# Patient Record
Sex: FEMALE | Race: WHITE | NOT HISPANIC OR LATINO | Employment: PART TIME | URBAN - NONMETROPOLITAN AREA
[De-identification: names, ages, dates, MRNs, and addresses within clinical notes are randomized per-mention and may not be internally consistent; named-entity substitution may affect disease eponyms.]

---

## 2023-12-08 ENCOUNTER — OFFICE VISIT (OUTPATIENT)
Dept: OTOLARYNGOLOGY | Facility: CLINIC | Age: 58
End: 2023-12-08
Payer: COMMERCIAL

## 2023-12-08 DIAGNOSIS — H93.A2 PULSATILE TINNITUS OF LEFT EAR: Primary | ICD-10-CM

## 2023-12-08 PROCEDURE — 3008F BODY MASS INDEX DOCD: CPT | Performed by: OTOLARYNGOLOGY

## 2023-12-08 PROCEDURE — 99204 OFFICE O/P NEW MOD 45 MIN: CPT | Performed by: OTOLARYNGOLOGY

## 2023-12-08 NOTE — LETTER
"December 8, 2023     Valentín Stovall DO  [address]  [facility] [address]    Patient: Pastora Lennon   YOB: 1965   Date of Visit: 12/8/2023       Dear Dr. Valentín Stovall DO:    Thank you for referring Pastora Lennon to me for evaluation. Below are my notes for this consultation.  If you have questions, please do not hesitate to call me. I look forward to following your patient along with you.       Sincerely,     Kathy Maharaj MD      CC: No Recipients  ______________________________________________________________________________________    History Of Present Illness  Pastora Lennon is a 58 y.o. female presenting with pulsatile sound humming in her left ear. She is kindly referred by Bernardino Stovall DO.     She had a pulsating sound in her left ear for couple of months, it went away 2-3 days ago. Now, she has a constant humming sound in her left ear. This morning she heard it little bit, then it went away again. There was fullness in her left ear, it went away. She has tried oral doxycycline, mucinex, fluticasone nasal spray, montelukast.     Since August 2023, she has a cough and phlegm.   CT head April 2023 was essentially normal.    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     Copy from her audiological history :   \"... Tinnitus: She has been perceiving a mostly constant pulsatile tinnitus (hears her heart beat) in her LEFT ear since October 2023. She stated it is exacerbated with physical exertion.   Dizziness: Denied.  Otalgia: Denied.  Aural fullness/pressure: Experiences a feeling of fullness in her LEFT ear since October 2023.   Otologic history: Denied.  Noise exposure: Denied.  History of chemotherapy or radiation: Denied.  Family history of hearing loss: Denied.  Other Concerns: Sometimes her LEFT ear will pop. She has had a previous Hx of her RIGHT ear feeling plugged. She has her ear flushed, and then it no longer feels " "full.      IMPRESSIONS    RIGHT EAR: Essentially hearing within normal limits.  LEFT EAR: High-frequency hearing loss...\"    She gained weight in the past one year. Recently she has lost 4 pounds.   She has used pravastatin for high cholesterol.    TMs look intact.  My impression, her pulsatile tinnitus could be due to gaining weight, atherosclerosis and chronic cough. However, I'd like to ask for an MRI to make sure that she does not have any vascular abnormality and/or tumor.    Plan:  1- MRI and MRA  2- follow up after the imaging studies     Past Medical History  She has a past medical history of Bitten or stung by nonvenomous insect and other nonvenomous arthropods, initial encounter (04/19/2016).    Surgical History  She has a past surgical history that includes Cholecystectomy (05/15/2014).     Social History  She has no history on file for tobacco use, alcohol use, and drug use.    Family History  No family history on file.     Allergies  Patient has no allergy information on record.    Review of Systems   Tinnitus  Ears feel full  Nasal discharge  Sinus pressure  Cough  Coughing up sputum  Headache  Hot flashes       Physical Exam    General appearance: Healthy-appearing, well-nourished, well groomed, in no acute distress.     Head and Face: Atraumatic with no masses, lesions, or scarring.      Salivary glands: No tenderness of the parotid glands or parotid masses.     No tenderness of the submandibular glands or submandibular masses.      Facial strength: Normal strength and symmetry, no synkinesis or facial tic.     Eyes: Conjunctivas look non-hyperemic bilaterally    Ears: Bilaterally mild moderate wax in ear canals. Tympanic membranes look intact, no hyperemia, fluid or retraction. Hearing grossly normal.      Nose: Mucosa looks normal. No purulent discharge.    Oral Cavity/Mouth: Lips and tongue look normal.     Throat: No postnasal discharge. No tonsil hypertrophy. No hyperemia.    Neck: Symmetrical, " trachea midline.     Pulmonary: Normal respiratory effort.     Lymphatic: No palpable pathologic lymph nodes at neck.     Neurological/Psychiatric Orientation to person, place, and time: Normal.     Mood and affect: Normal.      Extremities: No clubbing.     Skin: No significant skin lesions were noted at face or neck        Last Recorded Vitals  There were no vitals taken for this visit.    Relevant Results  Prior to Admission medications    Not on File     No results found.      Assessment/Plan  @  Problem List Items Addressed This Visit    None         Debra Alfaro  Otolaryngology - Head & Neck Surgery

## 2023-12-08 NOTE — PROGRESS NOTES
"History Of Present Illness  Pastora Lennon is a 58 y.o. female presenting with pulsatile sound humming in her left ear. She is kindly referred by Bernardino Stovall DO.     She had a pulsating sound in her left ear for couple of months, it went away 2-3 days ago. Now, she has a constant humming sound in her left ear. This morning she heard it little bit, then it went away again. There was fullness in her left ear, it went away. She has tried oral doxycycline, mucinex, fluticasone nasal spray, montelukast.     Since August 2023, she has a cough and phlegm.   CT head April 2023 was essentially normal.    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     Copy from her audiological history :   \"... Tinnitus: She has been perceiving a mostly constant pulsatile tinnitus (hears her heart beat) in her LEFT ear since October 2023. She stated it is exacerbated with physical exertion.   Dizziness: Denied.  Otalgia: Denied.  Aural fullness/pressure: Experiences a feeling of fullness in her LEFT ear since October 2023.   Otologic history: Denied.  Noise exposure: Denied.  History of chemotherapy or radiation: Denied.  Family history of hearing loss: Denied.  Other Concerns: Sometimes her LEFT ear will pop. She has had a previous Hx of her RIGHT ear feeling plugged. She has her ear flushed, and then it no longer feels full.      IMPRESSIONS    RIGHT EAR: Essentially hearing within normal limits.  LEFT EAR: High-frequency hearing loss...\"    She gained weight in the past one year. Recently she has lost 4 pounds.   She has used pravastatin for high cholesterol.    TMs look intact.  My impression, her pulsatile tinnitus could be due to gaining weight, atherosclerosis and chronic cough. However, I'd like to ask for an MRI to make sure that she does not have any vascular abnormality and/or tumor.    Plan:  1- MRI and MRA  2- follow up after the imaging studies     Past Medical History  She has " a past medical history of Bitten or stung by nonvenomous insect and other nonvenomous arthropods, initial encounter (04/19/2016).    Surgical History  She has a past surgical history that includes Cholecystectomy (05/15/2014).     Social History  She has no history on file for tobacco use, alcohol use, and drug use.    Family History  No family history on file.     Allergies  Patient has no allergy information on record.    Review of Systems   Tinnitus  Ears feel full  Nasal discharge  Sinus pressure  Cough  Coughing up sputum  Headache  Hot flashes       Physical Exam    General appearance: Healthy-appearing, well-nourished, well groomed, in no acute distress.     Head and Face: Atraumatic with no masses, lesions, or scarring.      Salivary glands: No tenderness of the parotid glands or parotid masses.     No tenderness of the submandibular glands or submandibular masses.      Facial strength: Normal strength and symmetry, no synkinesis or facial tic.     Eyes: Conjunctivas look non-hyperemic bilaterally    Ears: Bilaterally mild moderate wax in ear canals. Tympanic membranes look intact, no hyperemia, fluid or retraction. Hearing grossly normal.      Nose: Mucosa looks normal. No purulent discharge.    Oral Cavity/Mouth: Lips and tongue look normal.     Throat: No postnasal discharge. No tonsil hypertrophy. No hyperemia.    Neck: Symmetrical, trachea midline.     Pulmonary: Normal respiratory effort.     Lymphatic: No palpable pathologic lymph nodes at neck.     Neurological/Psychiatric Orientation to person, place, and time: Normal.     Mood and affect: Normal.      Extremities: No clubbing.     Skin: No significant skin lesions were noted at face or neck        Last Recorded Vitals  There were no vitals taken for this visit.    Relevant Results  (Result was added to note on 12.26.2023)    STUDY:  MR BRAIN W AND WO IV CONTRAST; MR ANGIO HEAD WO IV CONTRAST;  12/23/2023 11:15 am       INDICATION:  Signs/Symptoms:history of left pulsatile tinnitus, left high  frequency hearing loss; Signs/Symptoms:history of left pulsatile  tinnitus and left high frequency hearing loss.      COMPARISON:  None.      ACCESSION NUMBER(S):  [id number]; [id number]      ORDERING CLINICIAN:  ANDRES SKINNER      TECHNIQUE:  The brain was studied in the sagittal, axial and coronal planes  utilizing FLAIR, T1 and T2 weighted images.     Following intravenous  injection of gadolinium contrast, T1 weighted fat suppressed  multiplanar images were also performed.      FINDINGS:  There is a normal-size ventricular system.  There is no evidence of  intracranial mass or extra-axial collection.      There is polypoid mucosal thickening throughout the paranasal sinuses  with involvement greatest at the left frontal sinus, bilateral  anterior ethmoid air cells and sphenoid sinuses. Bilateral maxillary  sinuses and bilateral mastoid air cells also exhibit mucosal  thickening. Trace retained fluid is suspected in the left maxillary  sinus. The skull base, paranasal sinuses and orbital structures are  otherwise unremarkable      Diffusion weighted images and associated ADC maps of the brain were  unremarkable.  There is no evidence of diffusion restriction to  suggest the presence of acute infarction. Gradient echo T2 weighted  images fail to demonstrate hemosiderin deposition or other evidence  of hemorrhage.      Following intravenous injection of there is no abnormal enhancement.  There is normal contrast opacification of the dural venous sinuses.      Magnetic resonance angiography of the intracranial vessels was  performed utilizing 3-D time-of-flight imaging with 3-D  reconstruction.  The examination fails to reveal the presence of  focal narrowing or branch occlusion.  There is no evidence of  aneurysm or vascular malformation.      IMPRESSION:  * There is no evidence of mass, infarction or hemorrhage.  *No evidence of  "anomalous or aberrant arterial structure  *Normal contrast opacification of the dural venous sinuses  *Postinflammatory/postobstructive changes in the paranasal sinuses as  described          Assessment/Plan   Pastora Lennon is a 58 y.o. female presenting with pulsatile sound humming in her left ear. She is kindly referred by Bernardino Stovall DO.     She had a pulsating sound in her left ear for couple of months, it went away 2-3 days ago. Now, she has a constant humming sound in her left ear. This morning she heard it little bit, then it went away again. There was fullness in her left ear, it went away. She has tried oral doxycycline, mucinex, fluticasone nasal spray, montelukast.     Since August 2023, she has a cough and phlegm.   CT head April 2023 was essentially normal.    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     Copy from her audiological history :   \"... Tinnitus: She has been perceiving a mostly constant pulsatile tinnitus (hears her heart beat) in her LEFT ear since October 2023. She stated it is exacerbated with physical exertion.   Dizziness: Denied.  Otalgia: Denied.  Aural fullness/pressure: Experiences a feeling of fullness in her LEFT ear since October 2023.   Otologic history: Denied.  Noise exposure: Denied.  History of chemotherapy or radiation: Denied.  Family history of hearing loss: Denied.  Other Concerns: Sometimes her LEFT ear will pop. She has had a previous Hx of her RIGHT ear feeling plugged. She has her ear flushed, and then it no longer feels full.      IMPRESSIONS    RIGHT EAR: Essentially hearing within normal limits.  LEFT EAR: High-frequency hearing loss...\"    She gained weight in the past one year. Recently she has lost 4 pounds.   She has used pravastatin for high cholesterol.    TMs look intact.  My impression, her pulsatile tinnitus could be due to gaining weight, atherosclerosis and chronic cough. However, I'd like to ask for " an MRI to make sure that she does not have any vascular abnormality and/or tumor.    Plan:  1- MRI and MRA  2- follow up after the imaging studies    Kathy Maharaj  Otolaryngology - Head & Neck Surgery

## 2023-12-23 ENCOUNTER — HOSPITAL ENCOUNTER (OUTPATIENT)
Dept: RADIOLOGY | Facility: HOSPITAL | Age: 58
Discharge: HOME | End: 2023-12-23
Payer: COMMERCIAL

## 2023-12-23 DIAGNOSIS — H93.A2 PULSATILE TINNITUS OF LEFT EAR: ICD-10-CM

## 2023-12-23 PROCEDURE — 70553 MRI BRAIN STEM W/O & W/DYE: CPT

## 2023-12-23 PROCEDURE — 70553 MRI BRAIN STEM W/O & W/DYE: CPT | Performed by: RADIOLOGY

## 2023-12-23 PROCEDURE — A9575 INJ GADOTERATE MEGLUMI 0.1ML: HCPCS | Performed by: OTOLARYNGOLOGY

## 2023-12-23 PROCEDURE — 2550000001 HC RX 255 CONTRASTS: Performed by: OTOLARYNGOLOGY

## 2023-12-23 PROCEDURE — 70544 MR ANGIOGRAPHY HEAD W/O DYE: CPT | Performed by: RADIOLOGY

## 2023-12-23 PROCEDURE — 70544 MR ANGIOGRAPHY HEAD W/O DYE: CPT

## 2023-12-23 RX ORDER — GADOTERATE MEGLUMINE 376.9 MG/ML
18 INJECTION INTRAVENOUS
Status: COMPLETED | OUTPATIENT
Start: 2023-12-23 | End: 2023-12-23

## 2023-12-23 RX ADMIN — GADOTERATE MEGLUMINE 18 ML: 376.9 INJECTION INTRAVENOUS at 11:16

## 2024-01-05 ENCOUNTER — OFFICE VISIT (OUTPATIENT)
Dept: OTOLARYNGOLOGY | Facility: CLINIC | Age: 59
End: 2024-01-05
Payer: COMMERCIAL

## 2024-01-05 DIAGNOSIS — J32.3 CHRONIC SPHENOIDAL SINUSITIS: ICD-10-CM

## 2024-01-05 DIAGNOSIS — J32.2 CHRONIC ETHMOIDAL SINUSITIS: ICD-10-CM

## 2024-01-05 DIAGNOSIS — J32.1 CHRONIC FRONTAL SINUSITIS: ICD-10-CM

## 2024-01-05 DIAGNOSIS — J32.0 CHRONIC MAXILLARY SINUSITIS: Primary | ICD-10-CM

## 2024-01-05 PROCEDURE — 3008F BODY MASS INDEX DOCD: CPT | Performed by: OTOLARYNGOLOGY

## 2024-01-05 PROCEDURE — 99214 OFFICE O/P EST MOD 30 MIN: CPT | Performed by: OTOLARYNGOLOGY

## 2024-01-05 NOTE — PROGRESS NOTES
"History Of Present Illness    01.05.2024: She still has pulsatile tinnitus in her left ear. It is off and on. More prominent at night and in the morning. It started with a sinus infection in August. She has used two rounds of antibiotics, but she could not recover fully. Her MRI does not show any tumor or vascular malformation, but she has severe sinus disease affecting ethmoids, maxillary sphenoid and left frontal sinuses.     Plan:  1- clarithromycin tab  2- oral prednisone tab  3- follow up in 2 weeks    _________________________________________________________________    Pastora Lennon is a 58 y.o. female presenting with pulsatile sound humming in her left ear. She is kindly referred by Bernardino Stovall DO.     She had a pulsating sound in her left ear for couple of months, it went away 2-3 days ago. Now, she has a constant humming sound in her left ear. This morning she heard it little bit, then it went away again. There was fullness in her left ear, it went away. She has tried oral doxycycline, mucinex, fluticasone nasal spray, montelukast.     Since August 2023, she has a cough and phlegm.   CT head April 2023 was essentially normal.    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     Copy from her audiological history :   \"... Tinnitus: She has been perceiving a mostly constant pulsatile tinnitus (hears her heart beat) in her LEFT ear since October 2023. She stated it is exacerbated with physical exertion.   Dizziness: Denied.  Otalgia: Denied.  Aural fullness/pressure: Experiences a feeling of fullness in her LEFT ear since October 2023.   Otologic history: Denied.  Noise exposure: Denied.  History of chemotherapy or radiation: Denied.  Family history of hearing loss: Denied.  Other Concerns: Sometimes her LEFT ear will pop. She has had a previous Hx of her RIGHT ear feeling plugged. She has her ear flushed, and then it no longer feels " "full.      IMPRESSIONS    RIGHT EAR: Essentially hearing within normal limits.  LEFT EAR: High-frequency hearing loss...\"    She gained weight in the past one year. Recently she has lost 4 pounds.   She has used pravastatin for high cholesterol.    TMs look intact.  My impression, her pulsatile tinnitus could be due to gaining weight, atherosclerosis and chronic cough. However, I'd like to ask for an MRI to make sure that she does not have any vascular abnormality and/or tumor.    Plan:  1- MRI and MRA  2- follow up after the imaging studies     Past Medical History  She has a past medical history of Bitten or stung by nonvenomous insect and other nonvenomous arthropods, initial encounter (04/19/2016).    Surgical History  She has a past surgical history that includes Cholecystectomy (05/15/2014) and MR angio head wo IV contrast (12/23/2023).     Social History  She has no history on file for tobacco use, alcohol use, and drug use.    Family History  No family history on file.     Allergies  Patient has no allergy information on record.    Review of Systems   Tinnitus  Ears feel full  Nasal discharge  Sinus pressure  Cough  Coughing up sputum  Headache  Hot flashes       Physical Exam    General appearance: Healthy-appearing, well-nourished, well groomed, in no acute distress.     Head and Face: Atraumatic with no masses, lesions, or scarring.      Salivary glands: No tenderness of the parotid glands or parotid masses.     No tenderness of the submandibular glands or submandibular masses.      Facial strength: Normal strength and symmetry, no synkinesis or facial tic.     Eyes: Conjunctivas look non-hyperemic bilaterally    Ears: Bilaterally mild moderate wax in ear canals. Tympanic membranes look intact, no hyperemia, fluid or retraction. Hearing grossly normal.      Nose: Mucosa looks normal. No purulent discharge.    Oral Cavity/Mouth: Lips and tongue look normal.     Throat: No postnasal discharge. No tonsil " hypertrophy. No hyperemia.    Neck: Symmetrical, trachea midline.     Pulmonary: Normal respiratory effort.     Lymphatic: No palpable pathologic lymph nodes at neck.     Neurological/Psychiatric Orientation to person, place, and time: Normal.     Mood and affect: Normal.      Extremities: No clubbing.     Skin: No significant skin lesions were noted at face or neck        Last Recorded Vitals  There were no vitals taken for this visit.    Relevant Results  (Result was added to note on 12.26.2023)    STUDY:  MR BRAIN W AND WO IV CONTRAST; MR ANGIO HEAD WO IV CONTRAST;  12/23/2023 11:15 am      INDICATION:  Signs/Symptoms:history of left pulsatile tinnitus, left high  frequency hearing loss; Signs/Symptoms:history of left pulsatile  tinnitus and left high frequency hearing loss.      COMPARISON:  None.      ACCESSION NUMBER(S):  [id number]; [id number]      ORDERING CLINICIAN:  ANDRES SKINNER      TECHNIQUE:  The brain was studied in the sagittal, axial and coronal planes  utilizing FLAIR, T1 and T2 weighted images.     Following intravenous  injection of gadolinium contrast, T1 weighted fat suppressed  multiplanar images were also performed.      FINDINGS:  There is a normal-size ventricular system.  There is no evidence of  intracranial mass or extra-axial collection.      There is polypoid mucosal thickening throughout the paranasal sinuses  with involvement greatest at the left frontal sinus, bilateral  anterior ethmoid air cells and sphenoid sinuses. Bilateral maxillary  sinuses and bilateral mastoid air cells also exhibit mucosal  thickening. Trace retained fluid is suspected in the left maxillary  sinus. The skull base, paranasal sinuses and orbital structures are  otherwise unremarkable      Diffusion weighted images and associated ADC maps of the brain were  unremarkable.  There is no evidence of diffusion restriction to  suggest the presence of acute infarction. Gradient echo T2 weighted  images fail to  demonstrate hemosiderin deposition or other evidence  of hemorrhage.      Following intravenous injection of there is no abnormal enhancement.  There is normal contrast opacification of the dural venous sinuses.      Magnetic resonance angiography of the intracranial vessels was  performed utilizing 3-D time-of-flight imaging with 3-D  reconstruction.  The examination fails to reveal the presence of  focal narrowing or branch occlusion.  There is no evidence of  aneurysm or vascular malformation.      IMPRESSION:  * There is no evidence of mass, infarction or hemorrhage.  *No evidence of anomalous or aberrant arterial structure  *Normal contrast opacification of the dural venous sinuses  *Postinflammatory/postobstructive changes in the paranasal sinuses as  described          Assessment/Plan     01.05.2024: She still has pulsatile tinnitus in her left ear. It is off and on. More prominent at night and in the morning. It started with a sinus infection in August. She has used two rounds of antibiotics, but she could not recover fully. Her MRI does not show any tumor or vascular malformation, but she has severe sinus disease affecting ethmoids, maxillary sphenoid and left frontal sinuses.     Plan:  1- clarithromycin tab  2- oral prednisone tab  3- follow up in 2 weeks    _________________________________________________________________    Pastora Lennon is a 58 y.o. female presenting with pulsatile sound humming in her left ear. She is kindly referred by Bernardino Stovall DO.     She had a pulsating sound in her left ear for couple of months, it went away 2-3 days ago. Now, she has a constant humming sound in her left ear. This morning she heard it little bit, then it went away again. There was fullness in her left ear, it went away. She has tried oral doxycycline, mucinex, fluticasone nasal spray, montelukast.     Since August 2023, she has a cough and phlegm.   CT head April 2023 was essentially normal.    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     Copy from her audiological history :   \"... Tinnitus: She has been perceiving a mostly constant pulsatile tinnitus (hears her heart beat) in her LEFT ear since October 2023. She stated it is exacerbated with physical exertion.   Dizziness: Denied.  Otalgia: Denied.  Aural fullness/pressure: Experiences a feeling of fullness in her LEFT ear since October 2023.   Otologic history: Denied.  Noise exposure: Denied.  History of chemotherapy or radiation: Denied.  Family history of hearing loss: Denied.  Other Concerns: Sometimes her LEFT ear will pop. She has had a previous Hx of her RIGHT ear feeling plugged. She has her ear flushed, and then it no longer feels full.      IMPRESSIONS    RIGHT EAR: Essentially hearing within normal limits.  LEFT EAR: High-frequency hearing loss...\"    She gained weight in the past one year. Recently she has lost 4 pounds.   She has used pravastatin for high cholesterol.    TMs look intact.  My impression, her pulsatile tinnitus could be due to gaining weight, atherosclerosis and chronic cough. However, I'd like to ask for an MRI to make sure that she does not have any vascular abnormality and/or tumor.    Plan:  1- MRI and MRA  2- follow up after the imaging studies    Kathy Maharaj  Otolaryngology - Head & Neck Surgery  "

## 2024-01-06 RX ORDER — PREDNISONE 10 MG/1
TABLET ORAL
Qty: 34 TABLET | Refills: 0 | Status: SHIPPED | OUTPATIENT
Start: 2024-01-06

## 2024-01-06 RX ORDER — CLARITHROMYCIN 500 MG/1
500 TABLET, FILM COATED ORAL 2 TIMES DAILY
Qty: 20 TABLET | Refills: 0 | Status: SHIPPED | OUTPATIENT
Start: 2024-01-06 | End: 2024-01-16

## 2024-01-19 ENCOUNTER — APPOINTMENT (OUTPATIENT)
Dept: OTOLARYNGOLOGY | Facility: CLINIC | Age: 59
End: 2024-01-19
Payer: COMMERCIAL

## 2024-01-22 ENCOUNTER — OFFICE VISIT (OUTPATIENT)
Dept: OTOLARYNGOLOGY | Facility: CLINIC | Age: 59
End: 2024-01-22
Payer: COMMERCIAL

## 2024-01-22 DIAGNOSIS — J32.2 CHRONIC ETHMOIDAL SINUSITIS: Primary | ICD-10-CM

## 2024-01-22 PROCEDURE — 99213 OFFICE O/P EST LOW 20 MIN: CPT | Performed by: OTOLARYNGOLOGY

## 2024-01-22 PROCEDURE — 3008F BODY MASS INDEX DOCD: CPT | Performed by: OTOLARYNGOLOGY

## 2024-01-22 NOTE — PROGRESS NOTES
"History Of Present Illness    01.22.24: The sinus infection feels about 90% better. Little cough and phlegm remaining , but it feels so much better.     Oral prednisone has helped with pulsatile sound in her ears, but  it came back, when she tapered it to 2 pills a day (20 mg).    On examination, tympanic membranes look intact.  No nasal or postnasal discharge.    Plan:  1-follow-up in 1 month  2-another round of antibiotics and steroids could be considered if she cannot get better  ____________________________________________________________________    01.05.2024: She still has pulsatile tinnitus in her left ear. It is off and on. More prominent at night and in the morning. It started with a sinus infection in August. She has used two rounds of antibiotics, but she could not recover fully. Her MRI does not show any tumor or vascular malformation, but she has severe sinus disease affecting ethmoids, maxillary sphenoid and left frontal sinuses.     Plan:  1- clarithromycin tab  2- oral prednisone tab  3- follow up in 2 weeks    _________________________________________________________________    Pastora Lennon is a 58 y.o. female presenting with pulsatile sound humming in her left ear. She is kindly referred by Bernardino Stovall DO.     She had a pulsating sound in her left ear for couple of months, it went away 2-3 days ago. Now, she has a constant humming sound in her left ear. This morning she heard it little bit, then it went away again. There was fullness in her left ear, it went away. She has tried oral doxycycline, mucinex, fluticasone nasal spray, montelukast.     Since August 2023, she has a cough and phlegm.   CT head April 2023 was essentially normal.    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     Copy from her audiological history :   \"... Tinnitus: She has been perceiving a mostly constant pulsatile tinnitus (hears her heart beat) in her LEFT ear since " "October 2023. She stated it is exacerbated with physical exertion.   Dizziness: Denied.  Otalgia: Denied.  Aural fullness/pressure: Experiences a feeling of fullness in her LEFT ear since October 2023.   Otologic history: Denied.  Noise exposure: Denied.  History of chemotherapy or radiation: Denied.  Family history of hearing loss: Denied.  Other Concerns: Sometimes her LEFT ear will pop. She has had a previous Hx of her RIGHT ear feeling plugged. She has her ear flushed, and then it no longer feels full.      IMPRESSIONS    RIGHT EAR: Essentially hearing within normal limits.  LEFT EAR: High-frequency hearing loss...\"    She gained weight in the past one year. Recently she has lost 4 pounds.   She has used pravastatin for high cholesterol.    TMs look intact.  My impression, her pulsatile tinnitus could be due to gaining weight, atherosclerosis and chronic cough. However, I'd like to ask for an MRI to make sure that she does not have any vascular abnormality and/or tumor.    Plan:  1- MRI and MRA  2- follow up after the imaging studies     Past Medical History  She has a past medical history of Bitten or stung by nonvenomous insect and other nonvenomous arthropods, initial encounter (04/19/2016).    Surgical History  She has a past surgical history that includes Cholecystectomy (05/15/2014) and MR angio head wo IV contrast (12/23/2023).     Social History  She has no history on file for tobacco use, alcohol use, and drug use.    Family History  No family history on file.     Allergies  Patient has no allergy information on record.    Review of Systems   Tinnitus  Ears feel full  Nasal discharge  Sinus pressure  Cough  Coughing up sputum  Headache  Hot flashes       Physical Exam    General appearance: Healthy-appearing, well-nourished, well groomed, in no acute distress.     Head and Face: Atraumatic with no masses, lesions, or scarring.      Salivary glands: No tenderness of the parotid glands or parotid masses. "     No tenderness of the submandibular glands or submandibular masses.      Facial strength: Normal strength and symmetry, no synkinesis or facial tic.     Eyes: Conjunctivas look non-hyperemic bilaterally    Ears: Bilaterally mild moderate wax in ear canals. Tympanic membranes look intact, no hyperemia, fluid or retraction. Hearing grossly normal.      Nose: Mucosa looks normal. No purulent discharge.    Oral Cavity/Mouth: Lips and tongue look normal.     Throat: No postnasal discharge. No tonsil hypertrophy. No hyperemia.    Neck: Symmetrical, trachea midline.     Pulmonary: Normal respiratory effort.     Lymphatic: No palpable pathologic lymph nodes at neck.     Neurological/Psychiatric Orientation to person, place, and time: Normal.     Mood and affect: Normal.      Extremities: No clubbing.     Skin: No significant skin lesions were noted at face or neck        Last Recorded Vitals  There were no vitals taken for this visit.    Relevant Results  (Result was added to note on 12.26.2023)    STUDY:  MR BRAIN W AND WO IV CONTRAST; MR ANGIO HEAD WO IV CONTRAST;  12/23/2023 11:15 am      INDICATION:  Signs/Symptoms:history of left pulsatile tinnitus, left high  frequency hearing loss; Signs/Symptoms:history of left pulsatile  tinnitus and left high frequency hearing loss.      COMPARISON:  None.      ACCESSION NUMBER(S):  [id number]; [id number]      ORDERING CLINICIAN:  ANDRES SKINNER      TECHNIQUE:  The brain was studied in the sagittal, axial and coronal planes  utilizing FLAIR, T1 and T2 weighted images.     Following intravenous  injection of gadolinium contrast, T1 weighted fat suppressed  multiplanar images were also performed.      FINDINGS:  There is a normal-size ventricular system.  There is no evidence of  intracranial mass or extra-axial collection.      There is polypoid mucosal thickening throughout the paranasal sinuses  with involvement greatest at the left frontal sinus, bilateral  anterior  ethmoid air cells and sphenoid sinuses. Bilateral maxillary  sinuses and bilateral mastoid air cells also exhibit mucosal  thickening. Trace retained fluid is suspected in the left maxillary  sinus. The skull base, paranasal sinuses and orbital structures are  otherwise unremarkable      Diffusion weighted images and associated ADC maps of the brain were  unremarkable.  There is no evidence of diffusion restriction to  suggest the presence of acute infarction. Gradient echo T2 weighted  images fail to demonstrate hemosiderin deposition or other evidence  of hemorrhage.      Following intravenous injection of there is no abnormal enhancement.  There is normal contrast opacification of the dural venous sinuses.      Magnetic resonance angiography of the intracranial vessels was  performed utilizing 3-D time-of-flight imaging with 3-D  reconstruction.  The examination fails to reveal the presence of  focal narrowing or branch occlusion.  There is no evidence of  aneurysm or vascular malformation.      IMPRESSION:  * There is no evidence of mass, infarction or hemorrhage.  *No evidence of anomalous or aberrant arterial structure  *Normal contrast opacification of the dural venous sinuses  *Postinflammatory/postobstructive changes in the paranasal sinuses as  described          Assessment/Plan     01.22.24: The sinus infection feels about 90% better. Little residual cough and phlegm, but it feels so much better.     Oral prednisone has helped with pulsatile sound in her ears, but  it came back, when she tapered it to 2 pills a day (20 mg).    On examination, tympanic membranes look intact.  No nasal or postnasal discharge    Plan:  1-follow-up in 1 month  2-another round of antibiotics and steroids could be considered if she cannot get better  ____________________________________________________________________    01.05.2024: She still has pulsatile tinnitus in her left ear. It is off and on. More prominent at night and  "in the morning. It started with a sinus infection in August. She has used two rounds of antibiotics, but she could not recover fully. Her MRI does not show any tumor or vascular malformation, but she has severe sinus disease affecting ethmoids, maxillary sphenoid and left frontal sinuses.     Plan:  1- clarithromycin tab  2- oral prednisone tab  3- follow up in 2 weeks    _________________________________________________________________    Pastora Lennon is a 58 y.o. female presenting with pulsatile sound humming in her left ear. She is kindly referred by Bernardino Stovall DO.     She had a pulsating sound in her left ear for couple of months, it went away 2-3 days ago. Now, she has a constant humming sound in her left ear. This morning she heard it little bit, then it went away again. There was fullness in her left ear, it went away. She has tried oral doxycycline, mucinex, fluticasone nasal spray, montelukast.     Since August 2023, she has a cough and phlegm.   CT head April 2023 was essentially normal.    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     Copy from her audiological history :   \"... Tinnitus: She has been perceiving a mostly constant pulsatile tinnitus (hears her heart beat) in her LEFT ear since October 2023. She stated it is exacerbated with physical exertion.   Dizziness: Denied.  Otalgia: Denied.  Aural fullness/pressure: Experiences a feeling of fullness in her LEFT ear since October 2023.   Otologic history: Denied.  Noise exposure: Denied.  History of chemotherapy or radiation: Denied.  Family history of hearing loss: Denied.  Other Concerns: Sometimes her LEFT ear will pop. She has had a previous Hx of her RIGHT ear feeling plugged. She has her ear flushed, and then it no longer feels full.      IMPRESSIONS    RIGHT EAR: Essentially hearing within normal limits.  LEFT EAR: High-frequency hearing loss...\"    She gained weight in the past one year. " Recently she has lost 4 pounds.   She has used pravastatin for high cholesterol.    TMs look intact.  My impression, her pulsatile tinnitus could be due to gaining weight, atherosclerosis and chronic cough. However, I'd like to ask for an MRI to make sure that she does not have any vascular abnormality and/or tumor.    Plan:  1- MRI and MRA  2- follow up after the imaging studies    Kathy Maharaj  Otolaryngology - Head & Neck Surgery

## 2024-02-15 ENCOUNTER — APPOINTMENT (OUTPATIENT)
Dept: SLEEP MEDICINE | Facility: CLINIC | Age: 59
End: 2024-02-15
Payer: COMMERCIAL

## 2024-02-20 DIAGNOSIS — J32.9 CHRONIC SINUSITIS, UNSPECIFIED LOCATION: Primary | ICD-10-CM

## 2024-02-20 RX ORDER — PREDNISONE 10 MG/1
TABLET ORAL
Qty: 34 TABLET | Refills: 0 | Status: SHIPPED | OUTPATIENT
Start: 2024-02-20

## 2024-02-20 RX ORDER — CIPROFLOXACIN 500 MG/1
500 TABLET ORAL 2 TIMES DAILY
Qty: 20 TABLET | Refills: 0 | Status: SHIPPED | OUTPATIENT
Start: 2024-02-20 | End: 2024-03-01

## 2024-03-11 ENCOUNTER — APPOINTMENT (OUTPATIENT)
Dept: OTOLARYNGOLOGY | Facility: CLINIC | Age: 59
End: 2024-03-11
Payer: COMMERCIAL

## 2024-03-18 ENCOUNTER — APPOINTMENT (OUTPATIENT)
Dept: OTOLARYNGOLOGY | Facility: CLINIC | Age: 59
End: 2024-03-18
Payer: COMMERCIAL

## 2024-07-17 ENCOUNTER — APPOINTMENT (OUTPATIENT)
Dept: SLEEP MEDICINE | Facility: CLINIC | Age: 59
End: 2024-07-17
Payer: COMMERCIAL

## 2024-07-25 ENCOUNTER — APPOINTMENT (OUTPATIENT)
Dept: SLEEP MEDICINE | Facility: CLINIC | Age: 59
End: 2024-07-25
Payer: COMMERCIAL

## 2024-07-25 VITALS
DIASTOLIC BLOOD PRESSURE: 89 MMHG | BODY MASS INDEX: 37.45 KG/M2 | OXYGEN SATURATION: 95 % | SYSTOLIC BLOOD PRESSURE: 116 MMHG | WEIGHT: 218.2 LBS | HEART RATE: 75 BPM

## 2024-07-25 DIAGNOSIS — Z99.89 CONTINUOUS POSITIVE AIRWAY PRESSURE DEPENDENCE: ICD-10-CM

## 2024-07-25 DIAGNOSIS — E66.9 OBESITY (BMI 30-39.9): ICD-10-CM

## 2024-07-25 DIAGNOSIS — G47.33 OSA (OBSTRUCTIVE SLEEP APNEA): Primary | ICD-10-CM

## 2024-07-25 DIAGNOSIS — G47.10 HYPERSOMNIA: ICD-10-CM

## 2024-07-25 DIAGNOSIS — Z13.31 NEGATIVE DEPRESSION SCREENING: ICD-10-CM

## 2024-07-25 DIAGNOSIS — R51.9: ICD-10-CM

## 2024-07-25 DIAGNOSIS — Z87.891 FORMER SMOKER: ICD-10-CM

## 2024-07-25 DIAGNOSIS — R53.83 FATIGUE, UNSPECIFIED TYPE: ICD-10-CM

## 2024-07-25 PROCEDURE — G2211 COMPLEX E/M VISIT ADD ON: HCPCS

## 2024-07-25 PROCEDURE — 1036F TOBACCO NON-USER: CPT

## 2024-07-25 PROCEDURE — 99213 OFFICE O/P EST LOW 20 MIN: CPT

## 2024-07-25 ASSESSMENT — PATIENT HEALTH QUESTIONNAIRE - PHQ9
1. LITTLE INTEREST OR PLEASURE IN DOING THINGS: NOT AT ALL
2. FEELING DOWN, DEPRESSED OR HOPELESS: NOT AT ALL
SUM OF ALL RESPONSES TO PHQ9 QUESTIONS 1 AND 2: 0

## 2024-07-25 ASSESSMENT — ANXIETY QUESTIONNAIRES
1. FEELING NERVOUS, ANXIOUS, OR ON EDGE: NOT AT ALL
3. WORRYING TOO MUCH ABOUT DIFFERENT THINGS: NOT AT ALL
GAD7 TOTAL SCORE: 0
4. TROUBLE RELAXING: NOT AT ALL
2. NOT BEING ABLE TO STOP OR CONTROL WORRYING: NOT AT ALL
6. BECOMING EASILY ANNOYED OR IRRITABLE: NOT AT ALL
7. FEELING AFRAID AS IF SOMETHING AWFUL MIGHT HAPPEN: NOT AT ALL
5. BEING SO RESTLESS THAT IT IS HARD TO SIT STILL: NOT AT ALL

## 2024-07-25 ASSESSMENT — ENCOUNTER SYMPTOMS
INSOMNIA: 0
SNORING: 0
HYPERSOMNIA: 0
DIFFICULTY WITH CONCENTRATION: 0
FATIGUES EASILY: 0
FATIGUE: 0
DEPRESSED MOOD: 0
EXCESSIVE DAYTIME SLEEPINESS: 0

## 2024-07-25 ASSESSMENT — SLEEP AND FATIGUE QUESTIONNAIRES
WORRIED_DISTRESSED_DUE_TO_SLEEP: A LITTLE
HOW LIKELY ARE YOU TO NOD OFF OR FALL ASLEEP WHILE SITTING AND TALKING TO SOMEONE: WOULD NEVER DOZE
SATISFACTION_WITH_CURRENT_SLEEP_PATTERN: SATISFIED
HOW LIKELY ARE YOU TO NOD OFF OR FALL ASLEEP WHILE LYING DOWN TO REST IN THE AFTERNOON WHEN CIRCUMSTANCES PERMIT: HIGH CHANCE OF DOZING
DIFFICULTY_FALLING_ASLEEP: MODERATE
HOW LIKELY ARE YOU TO NOD OFF OR FALL ASLEEP WHILE SITTING QUIETLY AFTER LUNCH WITHOUT ALCOHOL: WOULD NEVER DOZE
DIFFICULTY_STAYING_ASLEEP: MODERATE
HOW LIKELY ARE YOU TO NOD OFF OR FALL ASLEEP WHEN YOU ARE A PASSENGER IN A CAR FOR AN HOUR WITHOUT A BREAK: MODERATE CHANCE OF DOZING
HOW LIKELY ARE YOU TO NOD OFF OR FALL ASLEEP WHILE SITTING AND READING: SLIGHT CHANCE OF DOZING
SITING INACTIVE IN A PUBLIC PLACE LIKE A CLASS ROOM OR A MOVIE THEATER: SLIGHT CHANCE OF DOZING
WAKING_TOO_EARLY: MODERATE
SLEEP_PROBLEM_INTERFERES_DAILY_ACTIVITIES: A LITTLE
HOW LIKELY ARE YOU TO NOD OFF OR FALL ASLEEP WHILE WATCHING TV: SLIGHT CHANCE OF DOZING
SLEEP_PROBLEM_NOTICEABLE_TO_OTHERS: A LITTLE
ESS-CHAD TOTAL SCORE: 8
HOW LIKELY ARE YOU TO NOD OFF OR FALL ASLEEP IN A CAR, WHILE STOPPED FOR A FEW MINUTES IN TRAFFIC: WOULD NEVER DOZE

## 2024-07-25 NOTE — PROGRESS NOTES
Patient: Pastora Alves  : 1965 AGE: 59 y.o. SEX:female   MRN: [id number]   Provider: [facility]     Location [facility]   Service Date: 2024     PCP: Valentín Stovall DO   Referred by:               [facility]  FOLLOW-UP VISIT NOTE        HISTORY OF PRESENT ILLNESS     Patient ID: Pastora Alves is a 59 y.o. female who presents to a [facility] for follow-up MERLE on PAP yearly follow-up .    Patient is here alone today.  The patient has pertinent hx of MERLE, HTN, obesity, asthma, allergic rhinitis, GERD, and chronic back pain.     Previous Visit's:  2023   Ms. PASTORA ALVES is a 57 year female who presents today for F/U MERLE on PAP therapy, review sleep study results. Patient has a pertinent hx of MERLE, HTN, obesity, asthma, allergic rhinitis, GERD, and chronic back pain.     Patient reports she is doing well overall. She absolutely loves her machine, she feels so much better being back on CPAP. Patient is using machine every night, no issues with machine. Her Resmed don is not updating, however, review of machine shows she is using every night. She has only had the machine for 23 days, not quite at 31 days for compliance. She is tolerating pressures and mask. She has minimal positional mask leak. Patient believes they are getting adequate sleep. She does not think she snores on the machine. She wakes up refreshed in the morning and denies any excessive daytime sleepiness. Denies nasal congestion, dry mouth, skin irritation, aerophagia, and air hunger.  She does have the sample mask. Initially [facility] sent her the wrong mask. She called them and again they sent her a Resmed Airfit instead of the Airtouch mask as well as the wrong size. They sent her a medium when she wears a small mask. i reached out [facility] rep today and they will correct the issue. She also has not gotten updated supplies including  filters.      06/01/2023  Patient here today for review of sleep study results and new machine. Previously patient did not think machine was working like it should. Needed new sleep study for new machine. She is currently on auto-CPAP 5 - 15 cmH20 EPR 3, has an old Le machine. I reviewed the sleep study results today in clinic. She was given a Resmed Airtouch F20 mask in the sleep lab which she likes. Patient newest sleep study shows moderate MERLE. New machine to be ordered today through [facility]. She was previously with [employer] but would like to change [employer] companies.      04/19/2023  Referred by Pulmonary. Here to get established. Used to see Pulmonary JOSE DE JESUS Kerry Rob CNP. Diagnosed with MERLE by PSG in 2014, currently on auto-CPAP 5 - 15 cmH20.  Patient reports that she has been noticing that the machine is not giving enough pressure at times. She is now waking up feeling tired, drowsy and not refreshed like she used. Denies any recent weight gain or changes in lifestyle or medications. She is concerned her machine is not working properly. Denies any significant mask leak or intolerance to mask. We reviewed download off machine today, excellent compliance noted      Interval History  Patient was last seen in 8/2023.     07/25/24   Patient here today for yearly follow-up. She reports that she is doing well with CPAP. She uses nightly, denied any issues with mask, leak, pressure or machine. She report that the previous mask prescribed is doing much better. She is currently utilizing a Resmed Airtouch F20 mask. Reviewed data from machine as well as download. She has used 30/30 days for average usage of 7 hrs 52 mins with residual AHI of 0.2/hr.   Weight is stable. BP is WNL. Negative MH screens    We discussed today that I will be leaving [facility] as of August 16th, 2024. I discussed follow-up plan with patient today. She will be transitioned to Dr. Pedro Ramirez in [place]. His clinic and contact information was given  to the patient in clinic. She verbalized understanding.  SLEEP HISTORY     SLEEP STUDY HISTORY  PSG - 5/9/2023  BMI - 37.2  TRT - 472.5 mins (7.9 hrs)  TST - 298.5 mins (5 hrs)  SE - 63.2%  RDI3% - 17.5/hr  RDI4% - 2.6/hr  MISAEL - 0/hr  SpO2 viraj - 87%   <88% = 5.3 mins  PLM Index - 0/hr with 0% associated with arousal    Sleep Stages:  Wake - 174 mins    N1 - 24.5 mins  (8.2%)  N2 - 211.5 mins  (70.9%)  N3 - 29.5 mins  (9.9%)  REM - 33 mins  (11.1%)      SLEEP-WAKE SCHEDULE  Bedtime: 11p - 1 am  Wake time: 8 am    SLEEP HABITS   Smoking: former  ETOH: DENIED  Marijuana: DENIED  Caffeine:  YES  Sleep aids: OTC sleep aide     WEIGHT: stable    REVIEW OF SYSTEMS     REVIEW OF SYSTEMS  SLEEP ROS   Review of Systems   Constitutional:  Negative for fatigue.   Respiratory:  Negative for snoring.    Neurological:  Negative for difficulty with concentration and excessive daytime sleepiness.   Psychiatric/Behavioral:  The patient does not have insomnia.      Psych Review of Symptoms:    Anxiety:   Generalized Anxiety Symptoms: No difficulty controlling worry, no excessive worry, no difficulty with concentration, does not fatigues easily, no physiological symptoms of anxiety and no sleep disturbances due to anxiety.      Depressive Symptoms:   No depressed mood, no decreased interest, no fatigue, no hypersomnia, not irritable, no insomnia and no suicidal ideation.      Sleep Concerns:   No excessive daytime sleepiness, no difficulty staying asleep, no awakening from sleep, no difficulty falling asleep and no snoring.             All other systems have been reviewed and are negative.      ALLERGIES     Allergies   Allergen Reactions    Penicillins Hives       MEDICATIONS     No current outpatient medications on file.     No current facility-administered medications for this visit.       PAST HISTORY     PERTINENT PAST MEDICAL HISTORY: See HPI    PERTINENT PAST SURGICAL HISTORY for Sleep Medicine:  non-contributory    PERTINENT  "FAMILY HISTORY for Sleep Medicine:  Patient denies family history of any sleep disorder.  Patient denies family history of sleep apnea.    PERTINENT SOCIAL HISTORY:  She  reports that she has quit smoking. Her smoking use included cigarettes. She has never used smokeless tobacco. No history on file for alcohol use and drug use. She currently lives with family and retired    Active Problems, Allergy List, Medication List, and PMH/PSH/FH/Social Hx have been reviewed and reconciled in chart. No significant changes unless documented in the pertinent chart section. Updates made when necessary.     PHYSICAL EXAM     VITAL SIGNS: /89   Pulse 75   Wt 99 kg (218 lb 3.2 oz)   SpO2 95%   BMI 37.45 kg/m²     CURRENT WEIGHT:   Vitals:    07/25/24 1459   Weight: 99 kg (218 lb 3.2 oz)      BMI: Body mass index is 37.45 kg/m².     PREVIOUS WEIGHTS:  Wt Readings from Last 3 Encounters:   07/25/24 99 kg (218 lb 3.2 oz)   08/03/23 99.3 kg (219 lb)   06/01/23 99.1 kg (218 lb 6 oz)       Today ESS: 8  Today GALLITO: 11  Today IMLLY-7: 0   Today PHQ-2: NEGATIVE SCREEN    PHYSICAL EXAMINATION  General appearance: awake alert in NAD  Affect: normal  Skin: no rash noted to face  HEENT: Nasal congestion absent  Teeth: normal dentition  Lungs: no cough.  Extr: moves all four extremities  Neuro: normal speech        RESULTS/DATA     No results found for: \"IRON\", \"TRANSFERRIN\", \"IRONSAT\", \"TIBC\", \"FERRITIN\"    Bicarbonate   Date Value Ref Range Status   12/09/2021 28 21 - 32 mmol/L Final       PAP Adherence  A PAP adherence download was obtained and data was reviewed personally today in clinic.      ASSESSMENT/PLAN     Assessment/Plan   Pastora Lennon is a 59 y.o. female presents today in [facility] with the following problems:    CURRENT DME: [facility]    OBSTRUCTIVE SLEEP APNEA, moderate (PSG AHI: 17.5/hr)  currently on auto-CPAP 5 - 15 cmH20 EPR 3  Excellent compliance to PAP therapy, residual AHI at goal, and " good control of MERLE symptoms  - Patient's risk factors for MERLE: BMI, age, postmenopause, HTN, asthma, and narrow crowded upper airway anatomy  - MERLE diagnosed by PSG in 5/2023. Reviewed sleep studies previously in clinic. See HPI.  - Retrieved and personally reviewed recent PAP adherence download data today. See HPI.   - - used 30/30 days for an average of 7 hrs 46 mins with residual AHI of 0.2/hr  - Continue current PAP settings.     EXCESSIVE DAYTIME SLEEPINESS / FATIGUE  - likely due to undertreated MERLE - machine not working  - will re-evaluate after testing and new machine is ordered  - may need to look at other causes if not corrected with new machine  - discussed with patient good sleep hygiene  - will reevaluate after successful testing and treatment of MERLE  06/01/2023  - continues to report EDS and fatigue, no significant changes since previous visit, did however notice a difference the night after PSG was worse during that day off the PAP machine  08/03/2023  - significant improvement in symptoms, most days she feels energized  07/25/24  - continues to report good control of symptoms, improved energy    MORNING/DAILY HEADACHES  - patient previously reported waking up everyday with a HA  - she nows states that her morning HA are very minimal, she has a significant improvement in mood and daily functioning  07/25/24  - resolution of daily headaches with CPAP usage       OBESITY  - BMI today Body mass index is 37.45 kg/m².   - no significant weight changes noted or reported  - Encouraged patient to lose weight with diet and exercise.   - Weight loss can help in the long term treatment of MERLE.  - defer management to PCP     BLOOD PRESSURE CHECK  - BP today 116/89  - vitals WNL    DEPRESSION / ANXIETY screen  - NEGATIVE SCREEN today 07/25/24      SMOKING STATUS screen  - former smoker     All of patient's questions were answered. She verbalizes understanding and agreement with my assessment and plan.

## 2024-07-25 NOTE — PATIENT INSTRUCTIONS
It was a pleasure meeting you today Pastora Lennon     CURRENT DME: [facility]    As we discussed today in clinic:    1. Continue with your current CPAP setting.   2. Encouraged to lose weight.   3. Remember, don't drive when sleepy.   4. Follow-up with Dr. Pedro Ramirez, my last day with \[facility]\"" will be August 16th, 2024. I am here to assist you for any issues in the interim.     As a general guideline, please replace your: PAP cushions every 2-4 weeks, mask every 3-6 months, hose every 3-6 months, Filter (disposable) every 2-4 weeks; machine may need replacement in 5-10 years (once they stop working).     Please follow-up in 1 year    ALWAYS BRING YOUR CPAP / BIPAP WITH YOU TO EVERY APPOINTMENT!  THANKS    FOR QUESTIONS AND CONCERNS:   1. In case of problems with machine or mask interface, please contact your DME company first. DME is the company that provides you the machine and/or CPAP supplies. If [employer] if your DME, you can reach them at [phone] or [employer] ([employer]) [phone].  2. For SLEEP STUDY appointments, please call [phone] ([place]) or [phone] / [phone] ([place]) or any other  Sleep Lab location please call [phone]  3. For MEDICAL QUESTIONS, MEDICATION REFILLS, or CLINIC APPOINTMENT SCHEDULING, please call [phone] and my practice lead Chika would gladly assist you with any concerns.     Here at [facility], we wish you a restful sleep!

## 2024-08-20 DIAGNOSIS — J06.9 URI, ACUTE: Primary | ICD-10-CM

## 2024-08-20 RX ORDER — PREDNISONE 10 MG/1
TABLET ORAL
Qty: 34 TABLET | Refills: 0 | Status: SHIPPED | OUTPATIENT
Start: 2024-08-20

## 2024-08-20 RX ORDER — CLARITHROMYCIN 250 MG/1
250 TABLET, FILM COATED ORAL 2 TIMES DAILY
Qty: 20 TABLET | Refills: 0 | Status: SHIPPED | OUTPATIENT
Start: 2024-08-20 | End: 2024-08-30

## 2025-01-17 ENCOUNTER — HOSPITAL ENCOUNTER (EMERGENCY)
Facility: HOSPITAL | Age: 60
Discharge: HOME | End: 2025-01-17
Attending: STUDENT IN AN ORGANIZED HEALTH CARE EDUCATION/TRAINING PROGRAM
Payer: COMMERCIAL

## 2025-01-17 ENCOUNTER — APPOINTMENT (OUTPATIENT)
Dept: RADIOLOGY | Facility: HOSPITAL | Age: 60
End: 2025-01-17
Payer: COMMERCIAL

## 2025-01-17 VITALS
SYSTOLIC BLOOD PRESSURE: 146 MMHG | RESPIRATION RATE: 16 BRPM | TEMPERATURE: 97.7 F | HEART RATE: 74 BPM | OXYGEN SATURATION: 95 % | WEIGHT: 212 LBS | HEIGHT: 64 IN | BODY MASS INDEX: 36.19 KG/M2 | DIASTOLIC BLOOD PRESSURE: 73 MMHG
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
## 2025-01-17 DIAGNOSIS — S80.11XA CONTUSION OF RIGHT LOWER EXTREMITY, INITIAL ENCOUNTER: ICD-10-CM

## 2025-01-17 PROCEDURE — 73590 X-RAY EXAM OF LOWER LEG: CPT | Mod: RIGHT SIDE | Performed by: RADIOLOGY

## 2025-01-17 PROCEDURE — 73502 X-RAY EXAM HIP UNI 2-3 VIEWS: CPT | Mod: RT

## 2025-01-17 PROCEDURE — 2500000004 HC RX 250 GENERAL PHARMACY W/ HCPCS (ALT 636 FOR OP/ED): Performed by: STUDENT IN AN ORGANIZED HEALTH CARE EDUCATION/TRAINING PROGRAM

## 2025-01-17 PROCEDURE — 73590 X-RAY EXAM OF LOWER LEG: CPT | Mod: RT

## 2025-01-17 PROCEDURE — 99284 EMERGENCY DEPT VISIT MOD MDM: CPT | Performed by: STUDENT IN AN ORGANIZED HEALTH CARE EDUCATION/TRAINING PROGRAM

## 2025-01-17 PROCEDURE — 73552 X-RAY EXAM OF FEMUR 2/>: CPT | Mod: RT

## 2025-01-17 PROCEDURE — 73502 X-RAY EXAM HIP UNI 2-3 VIEWS: CPT | Mod: RIGHT SIDE | Performed by: RADIOLOGY

## 2025-01-17 PROCEDURE — 96372 THER/PROPH/DIAG INJ SC/IM: CPT | Performed by: STUDENT IN AN ORGANIZED HEALTH CARE EDUCATION/TRAINING PROGRAM

## 2025-01-17 PROCEDURE — 73552 X-RAY EXAM OF FEMUR 2/>: CPT | Mod: RIGHT SIDE | Performed by: RADIOLOGY

## 2025-01-17 PROCEDURE — 73610 X-RAY EXAM OF ANKLE: CPT | Mod: RIGHT SIDE | Performed by: RADIOLOGY

## 2025-01-17 PROCEDURE — 73610 X-RAY EXAM OF ANKLE: CPT | Mod: RT

## 2025-01-17 RX ORDER — KETOROLAC TROMETHAMINE 15 MG/ML
15 INJECTION, SOLUTION INTRAMUSCULAR; INTRAVENOUS ONCE
Status: COMPLETED | OUTPATIENT
Start: 2025-01-17 | End: 2025-01-17

## 2025-01-17 RX ADMIN — KETOROLAC TROMETHAMINE 15 MG: 15 INJECTION, SOLUTION INTRAMUSCULAR; INTRAVENOUS at 12:30

## 2025-01-17 ASSESSMENT — PAIN - FUNCTIONAL ASSESSMENT
PAIN_FUNCTIONAL_ASSESSMENT: 0-10
PAIN_FUNCTIONAL_ASSESSMENT: 0-10

## 2025-01-17 ASSESSMENT — PAIN DESCRIPTION - LOCATION
LOCATION: LEG
LOCATION: LEG

## 2025-01-17 ASSESSMENT — PAIN DESCRIPTION - PROGRESSION: CLINICAL_PROGRESSION: GRADUALLY IMPROVING

## 2025-01-17 ASSESSMENT — PAIN DESCRIPTION - PAIN TYPE: TYPE: ACUTE PAIN

## 2025-01-17 ASSESSMENT — PAIN DESCRIPTION - FREQUENCY: FREQUENCY: CONSTANT/CONTINUOUS

## 2025-01-17 ASSESSMENT — COLUMBIA-SUICIDE SEVERITY RATING SCALE - C-SSRS
2. HAVE YOU ACTUALLY HAD ANY THOUGHTS OF KILLING YOURSELF?: NO
6. HAVE YOU EVER DONE ANYTHING, STARTED TO DO ANYTHING, OR PREPARED TO DO ANYTHING TO END YOUR LIFE?: NO
1. IN THE PAST MONTH, HAVE YOU WISHED YOU WERE DEAD OR WISHED YOU COULD GO TO SLEEP AND NOT WAKE UP?: NO

## 2025-01-17 ASSESSMENT — PAIN SCALES - GENERAL
PAINLEVEL_OUTOF10: 10 - WORST POSSIBLE PAIN
PAINLEVEL_OUTOF10: 3
PAINLEVEL_OUTOF10: 10 - WORST POSSIBLE PAIN

## 2025-01-17 ASSESSMENT — PAIN DESCRIPTION - ORIENTATION
ORIENTATION: RIGHT
ORIENTATION: RIGHT

## 2025-01-17 ASSESSMENT — PAIN DESCRIPTION - DESCRIPTORS: DESCRIPTORS: ACHING;THROBBING

## 2025-01-17 NOTE — ED PROVIDER NOTES
Chief Complaint: Fall downstairs  HPI: This is a 59-year-old female, presenting to the emergency department for evaluation after a fall down the last 4 stairs, landing on her right leg.  Patient states she has been unable to bear weight secondary to pain since the fall.  She is not on blood thinners, did not hit her head, denies any loss of consciousness.  She denies any other injury or trauma.    Past Medical History:   Diagnosis Date    Bitten or stung by nonvenomous insect and other nonvenomous arthropods, initial encounter 04/19/2016    Tick bite      Past Surgical History:   Procedure Laterality Date    CHOLECYSTECTOMY  05/15/2014    Cholecystectomy    MR HEAD ANGIO WO IV CONTRAST  12/23/2023    MR HEAD ANGIO WO IV CONTRAST 12/23/2023 GEN MRI       Physical Exam  Vitals and nursing note reviewed.   Constitutional:       Appearance: Normal appearance.   HENT:      Head: Normocephalic and atraumatic.      Mouth/Throat:      Mouth: Mucous membranes are moist.   Eyes:      Conjunctiva/sclera: Conjunctivae normal.   Cardiovascular:      Rate and Rhythm: Normal rate.   Pulmonary:      Effort: Pulmonary effort is normal.   Abdominal:      General: Abdomen is flat.      Palpations: Abdomen is soft.   Musculoskeletal:         General: Normal range of motion.      Cervical back: Normal range of motion.   Skin:     General: Skin is warm and dry.      Comments: Abrasion to the lateral aspect of the right thigh as well as the medial aspect of the right ankle with some surrounding ecchymosis.  No lacerations, no crepitus, deformity, minimal tenderness.  Neurovascularly intact distally   Neurological:      General: No focal deficit present.      Mental Status: She is alert.   Psychiatric:         Mood and Affect: Mood normal.        ED Course/MDM  Diagnoses as of 01/17/25 1257   Fall, initial encounter   Contusion of right lower extremity, initial encounter     This is a 59 y.o. female presenting to the ED For evaluation  after a fall down the last 4 steps.  She states she landed on her right leg.  On physical exam, the patient is sitting comfortably in the chair, in no acute distress.  There is tenderness to the lateral aspect of the femur as well as the medial aspect of the right ankle, with overlying abrasion and ecchymosis.  X-ray imaging was obtained without any concern for acute fractures or dislocations.  She was given Toradol, and was advised to take Tylenol and ibuprofen around-the-clock for pain at home.  She was encouraged to return to the emergency department for any new or worsening symptoms and follow-up with her primary care provider.  She was discharged home in stable condition.    Final Impression  1. Contusions   2.  fall  Disposition/Plan: discharge home   Condition at disposition: Stable.     Mercedes Huynh DO  Emergency Medicine Physician     Mercedes Huynh DO  01/17/25 [id number]

## 2025-01-17 NOTE — ED TRIAGE NOTES
Pt fell down the basement stairs going to do laundry fell down 4 stairs landed on right leg. Pt states pain to the right leg 10/10 hurts to move it unable to bend leg. Scape on right elbow denies LOC. Denies any blood thinners. Denies hitting head HX COPD, HTN

## 2025-02-06 ENCOUNTER — HOSPITAL ENCOUNTER (OUTPATIENT)
Dept: RADIOLOGY | Facility: HOSPITAL | Age: 60
Discharge: HOME | End: 2025-02-06
Payer: COMMERCIAL

## 2025-02-06 DIAGNOSIS — M25.561 PAIN IN RIGHT KNEE: ICD-10-CM

## 2025-02-06 PROCEDURE — 76882 US LMTD JT/FCL EVL NVASC XTR: CPT

## 2025-07-24 ENCOUNTER — APPOINTMENT (OUTPATIENT)
Dept: SLEEP MEDICINE | Facility: CLINIC | Age: 60
End: 2025-07-24
Payer: COMMERCIAL